# Patient Record
Sex: FEMALE | Race: WHITE | NOT HISPANIC OR LATINO | Employment: FULL TIME | URBAN - METROPOLITAN AREA
[De-identification: names, ages, dates, MRNs, and addresses within clinical notes are randomized per-mention and may not be internally consistent; named-entity substitution may affect disease eponyms.]

---

## 2020-05-27 ENCOUNTER — TELEMEDICINE (OUTPATIENT)
Dept: BARIATRICS | Facility: CLINIC | Age: 65
End: 2020-05-27
Payer: COMMERCIAL

## 2020-05-27 VITALS
DIASTOLIC BLOOD PRESSURE: 80 MMHG | HEIGHT: 62 IN | SYSTOLIC BLOOD PRESSURE: 115 MMHG | WEIGHT: 160 LBS | BODY MASS INDEX: 29.44 KG/M2

## 2020-05-27 DIAGNOSIS — E66.3 OVERWEIGHT: Primary | ICD-10-CM

## 2020-05-27 DIAGNOSIS — G47.33 OBSTRUCTIVE SLEEP APNEA SYNDROME: ICD-10-CM

## 2020-05-27 DIAGNOSIS — K90.0 CELIAC DISEASE: ICD-10-CM

## 2020-05-27 DIAGNOSIS — E03.9 HYPOTHYROIDISM: ICD-10-CM

## 2020-05-27 DIAGNOSIS — I10 ESSENTIAL HYPERTENSION: ICD-10-CM

## 2020-05-27 PROBLEM — K59.00 CONSTIPATION: Status: ACTIVE | Noted: 2018-11-01

## 2020-05-27 PROBLEM — M25.511 CHRONIC RIGHT SHOULDER PAIN: Status: ACTIVE | Noted: 2018-04-26

## 2020-05-27 PROBLEM — M75.101 TEAR OF RIGHT ROTATOR CUFF: Status: ACTIVE | Noted: 2018-04-26

## 2020-05-27 PROBLEM — H60.8X1 CHRONIC ACTINIC OTITIS EXTERNA OF RIGHT EAR: Status: ACTIVE | Noted: 2020-05-27

## 2020-05-27 PROBLEM — G89.29 CHRONIC RIGHT SHOULDER PAIN: Status: ACTIVE | Noted: 2018-04-26

## 2020-05-27 PROCEDURE — 99214 OFFICE O/P EST MOD 30 MIN: CPT | Performed by: FAMILY MEDICINE

## 2020-05-27 RX ORDER — FAMOTIDINE 20 MG
TABLET ORAL
COMMUNITY
Start: 2016-06-22

## 2020-05-27 RX ORDER — CELECOXIB 100 MG/1
100 CAPSULE ORAL 2 TIMES DAILY PRN
COMMUNITY
Start: 2020-03-11

## 2020-05-27 RX ORDER — LISINOPRIL 5 MG/1
5 TABLET ORAL DAILY
COMMUNITY
Start: 2019-06-10

## 2020-05-27 RX ORDER — LEVOTHYROXINE SODIUM 100 UG/1
100 CAPSULE ORAL DAILY
COMMUNITY
Start: 2016-11-21

## 2020-06-01 ENCOUNTER — OFFICE VISIT (OUTPATIENT)
Dept: BARIATRICS | Facility: CLINIC | Age: 65
End: 2020-06-01

## 2020-06-01 VITALS — BODY MASS INDEX: 29.7 KG/M2 | HEIGHT: 62 IN | WEIGHT: 161.4 LBS | TEMPERATURE: 97.6 F

## 2020-06-01 DIAGNOSIS — R63.5 ABNORMAL WEIGHT GAIN: ICD-10-CM

## 2020-06-01 PROCEDURE — VLCD: Performed by: DIETITIAN, REGISTERED

## 2020-06-01 PROCEDURE — RECHECK: Performed by: DIETITIAN, REGISTERED

## 2020-06-04 ENCOUNTER — OFFICE VISIT (OUTPATIENT)
Dept: BARIATRICS | Facility: CLINIC | Age: 65
End: 2020-06-04

## 2020-06-04 DIAGNOSIS — R63.5 ABNORMAL WEIGHT GAIN: Primary | ICD-10-CM

## 2020-06-04 PROCEDURE — RECHECK: Performed by: DIETITIAN, REGISTERED

## 2020-06-16 ENCOUNTER — OFFICE VISIT (OUTPATIENT)
Dept: BARIATRICS | Facility: CLINIC | Age: 65
End: 2020-06-16

## 2020-06-16 VITALS
BODY MASS INDEX: 28.49 KG/M2 | SYSTOLIC BLOOD PRESSURE: 118 MMHG | DIASTOLIC BLOOD PRESSURE: 66 MMHG | WEIGHT: 154.8 LBS | HEIGHT: 62 IN

## 2020-06-16 DIAGNOSIS — E66.3 OVERWEIGHT: Primary | ICD-10-CM

## 2020-06-16 DIAGNOSIS — G47.33 OBSTRUCTIVE SLEEP APNEA SYNDROME: ICD-10-CM

## 2020-06-16 DIAGNOSIS — I10 ESSENTIAL HYPERTENSION: ICD-10-CM

## 2020-06-16 DIAGNOSIS — R63.5 ABNORMAL WEIGHT GAIN: ICD-10-CM

## 2020-06-16 PROCEDURE — RECHECK: Performed by: DIETITIAN, REGISTERED

## 2020-06-16 PROCEDURE — VLCD: Performed by: DIETITIAN, REGISTERED

## 2020-06-30 ENCOUNTER — OFFICE VISIT (OUTPATIENT)
Dept: BARIATRICS | Facility: CLINIC | Age: 65
End: 2020-06-30

## 2020-06-30 VITALS
BODY MASS INDEX: 27.97 KG/M2 | WEIGHT: 152 LBS | SYSTOLIC BLOOD PRESSURE: 124 MMHG | TEMPERATURE: 96.8 F | DIASTOLIC BLOOD PRESSURE: 72 MMHG | HEIGHT: 62 IN

## 2020-06-30 DIAGNOSIS — R63.5 ABNORMAL WEIGHT GAIN: ICD-10-CM

## 2020-06-30 PROCEDURE — VLCD: Performed by: DIETITIAN, REGISTERED

## 2020-06-30 PROCEDURE — RECHECK: Performed by: DIETITIAN, REGISTERED

## 2020-07-14 ENCOUNTER — OFFICE VISIT (OUTPATIENT)
Dept: BARIATRICS | Facility: CLINIC | Age: 65
End: 2020-07-14
Payer: COMMERCIAL

## 2020-07-14 VITALS
WEIGHT: 148.3 LBS | HEART RATE: 79 BPM | HEIGHT: 62 IN | TEMPERATURE: 96.8 F | SYSTOLIC BLOOD PRESSURE: 106 MMHG | BODY MASS INDEX: 27.29 KG/M2 | DIASTOLIC BLOOD PRESSURE: 80 MMHG

## 2020-07-14 DIAGNOSIS — E03.9 HYPOTHYROIDISM: ICD-10-CM

## 2020-07-14 DIAGNOSIS — G47.33 OBSTRUCTIVE SLEEP APNEA SYNDROME: ICD-10-CM

## 2020-07-14 DIAGNOSIS — E66.3 OVERWEIGHT: Primary | ICD-10-CM

## 2020-07-14 DIAGNOSIS — I10 ESSENTIAL HYPERTENSION: ICD-10-CM

## 2020-07-14 PROCEDURE — 99214 OFFICE O/P EST MOD 30 MIN: CPT | Performed by: FAMILY MEDICINE

## 2020-07-14 NOTE — PROGRESS NOTES
Assessment/Plan:    No problem-specific Assessment & Plan notes found for this encounter  Diagnoses and all orders for this visit:    Overweight    Obstructive sleep apnea syndrome    Hypothyroidism    Essential hypertension        -Patient is pursuing Very Low Calorie Diet-VLCD  -Initial weight loss goal of 5-10% weight loss for improved health  -Screening labs- done 7/2/2020  Using lisinopril PRN for prevention  Initial: 161lbs  Current: 148 3lbs  Change: -12 7lbs  Goal: 140lbs    Cont VLCD guidelines as per RD  Follow up in approximately 2 weeks with Non-Surgical Dietician  Subjective:   Chief Complaint   Patient presents with    Follow-up     mwm 6 wk follow up       Patient ID: Pao Jasso  is a 59 y o  female with excess weight/obesity here to pursue weight management  Patient is pursuing Very Low Calorie Diet-VLCD  HPI     VLCD follow up  She is in week 6 of VLCD  Tolerating well  Plans to do 2 more weeks  Lost -12 3lbs  Has tried to increase snack since she has armand more active  Water intake at goal      The following portions of the patient's history were reviewed and updated as appropriate: allergies, current medications, past family history, past medical history, past social history, past surgical history and problem list     Review of Systems   Constitutional: Negative for activity change and appetite change  Respiratory: Negative  Cardiovascular: Negative  Gastrointestinal: Negative  Genitourinary: Negative  Musculoskeletal: Negative for arthralgias  Skin: Negative for rash  Psychiatric/Behavioral: Negative  Objective:    /80 (BP Location: Left arm, Patient Position: Sitting, Cuff Size: Large)   Pulse 79   Temp (!) 96 8 °F (36 °C)   Ht 5' 2" (1 575 m)   Wt 67 3 kg (148 lb 4 8 oz)   BMI 27 12 kg/m²      Physical Exam    Constitutional   General appearance: Abnormal   well developed and overweight     Eyes No conjunctival pallor  Ears, Nose, Mouth, and Throat Oral mucosa moist    Pulmonary   Respiratory effort: No increased work of breathing or signs of respiratory distress  Auscultation of lungs: Clear to auscultation, equal breath sounds bilaterally, no wheezes, no rales, no rhonci  Cardiovascular   Auscultation of heart: Normal rate and rhythm, normal S1 and S2, without murmurs  Examination of extremities for edema and/or varicosities: Normal   no edema  Abdomen   Abdomen: Abnormal   Bowel sounds were normal  The abdomen was soft and nontender     Musculoskeletal   Gait and station: Normal     Psychiatric   Orientation to person, place and time: Normal     Affect: appropriate

## 2020-07-28 ENCOUNTER — OFFICE VISIT (OUTPATIENT)
Dept: BARIATRICS | Facility: CLINIC | Age: 65
End: 2020-07-28

## 2020-07-28 VITALS — TEMPERATURE: 97.8 F | HEIGHT: 62 IN | BODY MASS INDEX: 26.92 KG/M2 | WEIGHT: 146.3 LBS

## 2020-07-28 DIAGNOSIS — R63.5 ABNORMAL WEIGHT GAIN: ICD-10-CM

## 2020-07-28 PROCEDURE — RECHECK

## 2020-07-28 PROCEDURE — VLCD

## 2020-07-28 NOTE — PROGRESS NOTES
Weight Management Medical Nutrition Assessment  Seng Gilbert presented for a 8 week VLCD follow-up session  Today's weight is  146 3#   She has lost 15 1# in the past 8 weeks ( 9 4% TBW)  Feels she is at goal on her home scale  She is ready to transition to a partial replacement program at this time  Would like to incorporate fruit and therefore will come out of ketosis  Meal plan provided  She will f/u with RD in 2 wks       Patient seen by Medical Provider in past 6 months:  yes  Requested to schedule appointment with Medical Provider: no      Anthropometric Measurements  Start Weight (#): 161 4#  Current Weight (#): 146 3 #  TBW % Change from start weight: 9 4%   Ideal Body Weight (#):110#  Goal Weight (#):140-145#     Weight Loss History  Previous weight loss attempts: Counseling with  MD  High Protein/Low CHO diets (Atkins, Union, etc )  Meal Replacements (Medifast, Slim Fast, etc )  Nutrition Counseling with RD  Self Created Diets (Portion Control, Healthy Food Choices, etc )     Food and Nutrition Related History      Food Recall  Breakfast: 7-8:00 Shake           Snack: 99:87 slice of cheese  Lunch: 12:00 soup  Snack:2 p m  bar,   4 p m  cheese stick  Dinner:6:00 Shake      Beverages: water  Volume of beverage intake: 80oz     Weekends: Same  Cravings: none reported   Trouble area of day: none reported     Frequency of Eating out: irregularly  Food restrictions: celiac  Cooking: self   Food Shopping: self     Physical Activity Intake  Activity:none  Frequency:infrequently  Physical limitations/barriers to exercise: none reported     Estimated Needs  Energy  [employer] Needs: BMR :1167  calories; weight maintenance no activity based on home scale of 143 lbs: 1382; weight maintenance lightly active     1584     Protein:60-75gm      (1 2-1 5g/kg IBW)  Fluid: 58oz     (35mL/kg IBW)     Nutrition Diagnosis  Yes;     Overweight/obesity  related to Excess energy intake as evidenced by  BMI more than normative standard for age and sex (overweight 25-29  9)     Nutrition Intervention     Nutrition Prescription  6492-8187 calories;  gm protein; 70-80 gm carb     Meal Plan (Reese/Pro/Carb)  B: 200, 27, 10  S: 100-150, 5-10, 10-20  L: 325-500, 21-28, 25  S: 160, 15, 13  D: 200, 27, 10      Nutrition Education:     Calorie controlled menu  Lean protein food choices  Healthy snack options  Food journaling tips      Nutrition Counseling:  Strategies: meal planning, portion sizes, healthy snack choices, hydration, fiber intake, protein intake, exercise, food journal        Monitoring and Evaluation:  Evaluation criteria:  Energy Intake  Meet protein needs  Maintain adequate hydration  Monitor weekly weight  Meal planning/preparation  Food journal   Decreased portions at mealtimes and snacks  Physical activity      Barriers to learning:none  Readiness to change: Action:  (Changing behavior)  Comprehension: good  Expected Compliance: good

## 2020-08-10 ENCOUNTER — OFFICE VISIT (OUTPATIENT)
Dept: BARIATRICS | Facility: CLINIC | Age: 65
End: 2020-08-10

## 2020-08-10 VITALS — BODY MASS INDEX: 26.72 KG/M2 | HEIGHT: 62 IN | TEMPERATURE: 97.5 F | WEIGHT: 145.2 LBS

## 2020-08-10 DIAGNOSIS — R63.5 ABNORMAL WEIGHT GAIN: ICD-10-CM

## 2020-08-10 PROCEDURE — WMDI30: Performed by: DIETITIAN, REGISTERED

## 2020-08-10 PROCEDURE — RECHECK: Performed by: DIETITIAN, REGISTERED

## 2020-08-10 NOTE — PROGRESS NOTES
Weight Management Medical Nutrition Assessment  Vishnu Funez presented for a meal planning session  Today's weight is  145 2#   She has lost 1 21# in the past 2 weeks and overall she has lost 16 2# (10% TBW) in the past 10 weeks   Feels she is at goal on her home scale  She is tolerating the transition to a partial replacement program at this time  Would like to incorporate fruit and therefore will come out of ketosis  Meal plan reviewed        Patient seen by Medical Provider in past 6 months:  yes  Requested to schedule appointment with Medical Provider: no      Anthropometric Measurements  Start Weight (#): 161 4#  Current Weight (#):145 2#  TBW % Change from start weight: 9 4%   Ideal Body Weight (#):110#  Goal Weight (#):140-145#     Weight Loss History  Previous weight loss attempts: Counseling with  MD  High Protein/Low CHO diets (Atkins, Union, etc )  Meal Replacements (Medifast, Slim Fast, etc )  Nutrition Counseling with RD  Self Created Diets (Portion Control, Healthy Food Choices, etc )     Food and Nutrition Related History      Food Recall  Breakfast: 7-8:00 Shake           Snack: 49:70 slice of cheese  Lunch: chicken / broccoli  Snack:2 p m  bar   4 p m  cheese stick  Dinner:Shake     Beverages: water  Volume of beverage intake: 80oz     Weekends: Same  Cravings: none reported   Trouble area of day: none reported     Frequency of Eating out: irregularly  Food restrictions: celiac  Cooking: self   Food Shopping: self     Physical Activity Intake  Activity:none  Frequency:infrequently  Physical limitations/barriers to exercise: none reported     Estimated Needs  Energy  [employer] [place] [employer] Needs:  BMR :1167  calories; weight maintenance no activity based on home scale of 143 lbs: 1382; weight maintenance lightly active     1584     Protein:60-75gm      (1 2-1 5g/kg IBW)  Fluid: 58oz     (35mL/kg IBW)     Nutrition Diagnosis  Yes;    Overweight/obesity  related to Excess energy intake as evidenced by  BMI more than normative standard for age and sex (overweight 25-29  9)     Nutrition Intervention     Nutrition Prescription  4839-1245 calories;  gm protein; 70-80 gm carb     Meal Plan (Reese/Pro/Carb)  B: 200, 27, 10  S: 100-150, 5-10, 10-20  L: 325-500, 21-28, 25  S: 160, 15, 13  D: 200, 27, 10      Nutrition Education:     Calorie controlled menu  Lean protein food choices  Healthy snack options  Food journaling tips      Nutrition Counseling:  Strategies: meal planning, portion sizes, healthy snack choices, hydration, fiber intake, protein intake, exercise, food journal        Monitoring and Evaluation:  Evaluation criteria:  Energy Intake  Meet protein needs  Maintain adequate hydration  Monitor weekly weight  Meal planning/preparation  Food journal   Decreased portions at mealtimes and snacks  Physical activity      Barriers to learning:none  Readiness to change: Action:  (Changing behavior)  Comprehension: good  Expected Compliance: good

## 2020-09-28 ENCOUNTER — OFFICE VISIT (OUTPATIENT)
Dept: BARIATRICS | Facility: CLINIC | Age: 65
End: 2020-09-28

## 2020-09-28 VITALS — WEIGHT: 145.84 LBS | HEIGHT: 62 IN | BODY MASS INDEX: 26.84 KG/M2

## 2020-09-28 DIAGNOSIS — R63.5 ABNORMAL WEIGHT GAIN: ICD-10-CM

## 2020-09-28 PROCEDURE — RECHECK: Performed by: DIETITIAN, REGISTERED

## 2020-09-28 PROCEDURE — WEIGHT: Performed by: DIETITIAN, REGISTERED

## 2021-03-30 DIAGNOSIS — Z23 ENCOUNTER FOR IMMUNIZATION: ICD-10-CM
